# Patient Record
Sex: FEMALE | Race: WHITE | NOT HISPANIC OR LATINO
[De-identification: names, ages, dates, MRNs, and addresses within clinical notes are randomized per-mention and may not be internally consistent; named-entity substitution may affect disease eponyms.]

---

## 2019-05-24 ENCOUNTER — RESULT REVIEW (OUTPATIENT)
Age: 34
End: 2019-05-24

## 2020-02-13 ENCOUNTER — TRANSCRIPTION ENCOUNTER (OUTPATIENT)
Age: 35
End: 2020-02-13

## 2020-09-11 ENCOUNTER — RESULT REVIEW (OUTPATIENT)
Age: 35
End: 2020-09-11

## 2022-02-03 PROBLEM — Z00.00 ENCOUNTER FOR PREVENTIVE HEALTH EXAMINATION: Status: ACTIVE | Noted: 2022-02-03

## 2022-03-01 DIAGNOSIS — Z92.89 PERSONAL HISTORY OF OTHER MEDICAL TREATMENT: ICD-10-CM

## 2022-03-01 DIAGNOSIS — B97.7 PAPILLOMAVIRUS AS THE CAUSE OF DISEASES CLASSIFIED ELSEWHERE: ICD-10-CM

## 2022-03-01 DIAGNOSIS — Z86.59 PERSONAL HISTORY OF OTHER MENTAL AND BEHAVIORAL DISORDERS: ICD-10-CM

## 2022-03-03 ENCOUNTER — TRANSCRIPTION ENCOUNTER (OUTPATIENT)
Age: 37
End: 2022-03-03

## 2022-03-03 ENCOUNTER — APPOINTMENT (OUTPATIENT)
Dept: HEMATOLOGY ONCOLOGY | Facility: CLINIC | Age: 37
End: 2022-03-03
Payer: COMMERCIAL

## 2022-03-03 ENCOUNTER — NON-APPOINTMENT (OUTPATIENT)
Age: 37
End: 2022-03-03

## 2022-03-03 VITALS
HEART RATE: 81 BPM | BODY MASS INDEX: 31.47 KG/M2 | TEMPERATURE: 98.3 F | OXYGEN SATURATION: 97 % | DIASTOLIC BLOOD PRESSURE: 67 MMHG | RESPIRATION RATE: 18 BRPM | SYSTOLIC BLOOD PRESSURE: 106 MMHG | HEIGHT: 62 IN | WEIGHT: 171 LBS

## 2022-03-03 DIAGNOSIS — F32.A ANXIETY DISORDER, UNSPECIFIED: ICD-10-CM

## 2022-03-03 DIAGNOSIS — Z86.79 PERSONAL HISTORY OF OTHER DISEASES OF THE CIRCULATORY SYSTEM: ICD-10-CM

## 2022-03-03 DIAGNOSIS — F41.9 ANXIETY DISORDER, UNSPECIFIED: ICD-10-CM

## 2022-03-03 DIAGNOSIS — Z80.3 FAMILY HISTORY OF MALIGNANT NEOPLASM OF BREAST: ICD-10-CM

## 2022-03-03 DIAGNOSIS — Z87.891 PERSONAL HISTORY OF NICOTINE DEPENDENCE: ICD-10-CM

## 2022-03-03 DIAGNOSIS — Z72.89 OTHER PROBLEMS RELATED TO LIFESTYLE: ICD-10-CM

## 2022-03-03 DIAGNOSIS — Z83.2 FAMILY HISTORY OF DISEASES OF THE BLOOD AND BLOOD-FORMING ORGANS AND CERTAIN DISORDERS INVOLVING THE IMMUNE MECHANISM: ICD-10-CM

## 2022-03-03 PROCEDURE — 99205 OFFICE O/P NEW HI 60 MIN: CPT

## 2022-03-03 RX ORDER — CHROMIUM 200 MCG
TABLET ORAL
Refills: 0 | Status: ACTIVE | COMMUNITY

## 2022-03-03 RX ORDER — MULTIVITAMIN
CAPSULE ORAL
Refills: 0 | Status: ACTIVE | COMMUNITY

## 2022-03-03 NOTE — REVIEW OF SYSTEMS
[Fatigue] : fatigue [Easy Bleeding] : a tendency for easy bleeding [Negative] : Allergic/Immunologic

## 2022-03-04 LAB
ERYTHROCYTE [SEDIMENTATION RATE] IN BLOOD BY WESTERGREN METHOD: 28 MM/HR
FACT VIII ACT/NOR PPP: 159 %
RBC # BLD: 3.36 M/UL
RETICS # AUTO: 3.9 %
RETICS AGGREG/RBC NFR: 131 K/UL
VWF AG PPP IA-ACNC: 205 %
VWF:RCO ACT/NOR PPP PL AGG: 121 %

## 2022-03-04 NOTE — HISTORY OF PRESENT ILLNESS
[de-identified] : Mrs. Brewer is a 36 year old female who is referred by Dr. Aguilar for initial consultation for anemia and history of hemorrhage. \par She reports having a history of anemia and is currently complaining of being  tired and fatigued.  She has been taking low-dose oral iron however it is causing constipation.  Previously she had postpartum hemorrhage especially after her second , resulting in the necessity for 2 units red cell transfusion and IV iron infusion.  She also reports excessive bleeding with wisdom tooth extraction and after first .  Her family history is significant for mother and daughter with excessive bruising and mother with history of anemia. [0 - No Distress] : Distress Level: 0

## 2022-03-04 NOTE — REASON FOR VISIT
[Initial Consultation] : an initial consultation for [FreeTextEntry2] : Iron Deficiency Anemia, bleeding disorder/Post Partum Hemorrhage

## 2022-03-04 NOTE — ASSESSMENT
[FreeTextEntry1] : This appears to be a relatively chronic and progressive finding.\par This is most likely related to anemia of pregnancy.  She appears to be tolerating oral iron well but I will initiate a complete hematological work-up at the time of this office visit.\par I will obtain a CBC with differential and a peripheral blood smear review.\par I will obtain a complete hematological workup to include a CMP, LDH, haptoglobin, reticulocyte count, B12/MMA/folic acid levels, TSH, SPEP, SIEP, IgQ,serum free light chains, SANA,  RF, panel, ferritin level, and a Angeles' test.\par I will obtain an evaluation for coagulopathy given her prior hemorrhage with the second , reported excessive bleeding with the first  as well as a dental extraction, history of easy bruisability and family history of easy bruisability.\par The patient will return in 3 weeks for followup, review of the above workup, and further recommendations.\par \par Thank you very much for allowing me to participate in the care of this patient. Should you have any questions or concerns please do not hesitate to call me directly.

## 2022-03-17 ENCOUNTER — APPOINTMENT (OUTPATIENT)
Dept: HEMATOLOGY ONCOLOGY | Facility: CLINIC | Age: 37
End: 2022-03-17
Payer: COMMERCIAL

## 2022-03-17 VITALS
BODY MASS INDEX: 32.57 KG/M2 | TEMPERATURE: 97.8 F | HEART RATE: 85 BPM | DIASTOLIC BLOOD PRESSURE: 70 MMHG | HEIGHT: 62 IN | OXYGEN SATURATION: 99 % | SYSTOLIC BLOOD PRESSURE: 115 MMHG | WEIGHT: 177 LBS | RESPIRATION RATE: 18 BRPM

## 2022-03-17 PROCEDURE — 99214 OFFICE O/P EST MOD 30 MIN: CPT

## 2022-04-06 LAB
FERRITIN SERPL-MCNC: 19 NG/ML
IRON SATN MFR SERPL: 29 %
IRON SERPL-MCNC: 139 UG/DL
TIBC SERPL-MCNC: 481 UG/DL
UIBC SERPL-MCNC: 342 UG/DL

## 2022-04-06 NOTE — ASSESSMENT
[FreeTextEntry1] : This appears to be a relatively chronic and progressive finding.\par This is most likely related to anemia of pregnancy.  She appears to be tolerating oral iron well but I will initiate a complete hematological work-up at the time of this office visit.\par I obtained a CBC with differential and a peripheral blood smear review.\par I obtained a complete hematological workup to include a CMP, LDH, haptoglobin, reticulocyte count, B12/MMA/folic acid levels, TSH, SPEP, SIEP, IgQ,serum free light chains, SANA,  RF, panel, ferritin level, and a Angeles' test.\par I obtained an evaluation for coagulopathy given her prior hemorrhage with the second , reported excessive bleeding with the first  as well as a dental extraction, history of easy bruisability and family history of easy bruisability.  The work-up was mostly unremarkable and her coags were normal as well as von Willebrand disease testing and factor VIII levels.  The repeat hemoglobin at that visit was 10.3 g/dL otherwise the work-up was essentially unremarkable with low normal iron levels.  She continues to maintain her hemoglobin therefore she will continue on oral iron.  Given her significant bleeding history with the otherwise normal coagulation work-up I am referring her to [facility] to obtain further evaluation, particularly for platelet aggregation disorders.\par She will return in 3 weeks for follow-up and further recommendations.\par

## 2022-04-06 NOTE — HISTORY OF PRESENT ILLNESS
[de-identified] : Mrs. Brewer is a 36 year old female who is referred by Dr. Aguilar for initial consultation for anemia and history of hemorrhage. \par She reports having a history of anemia and is currently complaining of being  tired and fatigued.  She has been taking low-dose oral iron however it is causing constipation.  Previously she had postpartum hemorrhage especially after her second , resulting in the necessity for 2 units red cell transfusion and IV iron infusion.  She also reports excessive bleeding with wisdom tooth extraction and after first .  Her family history is significant for mother and daughter with excessive bruising and mother with history of anemia. [de-identified] : Offers no new complaints at the time of this office visit.  She reports that she continues to tolerate all oral iron well.  She is here to review the hematological work-up from her initial office consultation visit. [0 - No Distress] : Distress Level: 0

## 2022-04-06 NOTE — REASON FOR VISIT
[Follow-Up Visit] : a follow-up visit for [FreeTextEntry2] : Iron Deficiency Anemia, bleeding disorder/Post Partum Hemorrhage

## 2022-04-07 ENCOUNTER — APPOINTMENT (OUTPATIENT)
Dept: HEMATOLOGY ONCOLOGY | Facility: CLINIC | Age: 37
End: 2022-04-07
Payer: COMMERCIAL

## 2022-04-19 ENCOUNTER — APPOINTMENT (OUTPATIENT)
Dept: HEMATOLOGY ONCOLOGY | Facility: CLINIC | Age: 37
End: 2022-04-19
Payer: COMMERCIAL

## 2022-04-19 VITALS
DIASTOLIC BLOOD PRESSURE: 67 MMHG | SYSTOLIC BLOOD PRESSURE: 110 MMHG | WEIGHT: 180 LBS | BODY MASS INDEX: 33.13 KG/M2 | HEIGHT: 62 IN | HEART RATE: 82 BPM | OXYGEN SATURATION: 97 % | RESPIRATION RATE: 18 BRPM | TEMPERATURE: 98.8 F

## 2022-04-19 DIAGNOSIS — D64.9 ANEMIA, UNSPECIFIED: ICD-10-CM

## 2022-04-19 PROCEDURE — 36415 COLL VENOUS BLD VENIPUNCTURE: CPT

## 2022-04-19 PROCEDURE — 99213 OFFICE O/P EST LOW 20 MIN: CPT | Mod: 25

## 2022-04-19 NOTE — REVIEW OF SYSTEMS
[Fatigue] : fatigue [Easy Bleeding] : a tendency for easy bleeding [Negative] : Allergic/Immunologic [Constipation] : constipation [FreeTextEntry7] : With oral iron

## 2022-04-19 NOTE — HISTORY OF PRESENT ILLNESS
[0 - No Distress] : Distress Level: 0 [de-identified] : Mrs. Brewer is a 36 year old female who is referred by Dr. Aguilar for initial consultation for anemia and history of hemorrhage. \par She reports having a history of anemia and is currently complaining of being  tired and fatigued.  She has been taking low-dose oral iron however it is causing constipation.  Previously she had postpartum hemorrhage especially after her second , resulting in the necessity for 2 units red cell transfusion and IV iron infusion.  She also reports excessive bleeding with wisdom tooth extraction and after first .  Her family history is significant for mother and daughter with excessive bruising and mother with history of anemia. [de-identified] : She presents for follow-up and is due next Friday for elective .  She has not been taking oral iron for the past week or 2 because of constipation issues.  Previous work-up failed to reveal a coagulopathy.  She was referred to [facility] for platelet aggregation studies but she is unaware of the results.  She requested that we receive the records however we have not received the records as of the time of this office visit.

## 2022-04-19 NOTE — ASSESSMENT
[FreeTextEntry1] : This appears to be a relatively chronic and progressive finding.\par This is most likely related to anemia of pregnancy.  She appears to be tolerating oral iron well but I will initiate a complete hematological work-up at the time of this office visit.\par I obtained a CBC with differential and a peripheral blood smear review.\par I obtained a complete hematological workup to include a CMP, LDH, haptoglobin, reticulocyte count, B12/MMA/folic acid levels, TSH, SPEP, SIEP, IgQ,serum free light chains, SANA,  RF, panel, ferritin level, and a Angeles' test.\par I obtained an evaluation for coagulopathy given her prior hemorrhage with the second , reported excessive bleeding with the first  as well as a dental extraction, history of easy bruisability and family history of easy bruisability.  The work-up was mostly unremarkable and her coags were normal as well as von Willebrand disease testing and factor VIII levels.  The repeat hemoglobin at that visit was 10.3 g/dL otherwise the work-up was essentially unremarkable with low normal iron levels.  She continues to maintain her hemoglobin therefore she will continue on oral iron.  Given her significant bleeding history with the otherwise normal coagulation work-up I am referring her to [facility] to obtain further evaluation, particularly for platelet aggregation disorders.\par We are requesting results of the platelet aggregation studies once again.  We have reviewed available labs which showed slight improvement in hemoglobin.  We discussed the possibility of her receiving IV iron after delivering.\par Continue routine, age-appropriate, healthcare maintenance \par History of present illness, review of systems, physical exam and treatment plan reviewed with . \par Office visit in 12 weeks or prn for new or worsening symptoms.  \par

## 2022-05-01 ENCOUNTER — TRANSCRIPTION ENCOUNTER (OUTPATIENT)
Age: 37
End: 2022-05-01

## 2022-09-03 LAB
ALBUMIN MFR SERPL ELPH: 52.8 %
ALBUMIN SERPL-MCNC: 3.4 G/DL
ALBUMIN/GLOB SERPL: 1.1 RATIO
ALPHA1 GLOB MFR SERPL ELPH: 6.5 %
ALPHA1 GLOB SERPL ELPH-MCNC: 0.4 G/DL
ALPHA2 GLOB MFR SERPL ELPH: 14.5 %
ALPHA2 GLOB SERPL ELPH-MCNC: 0.9 G/DL
ANA PAT FLD IF-IMP: NORMAL
ANA SER IF-ACNC: ABNORMAL
B-GLOBULIN MFR SERPL ELPH: 15 %
B-GLOBULIN SERPL ELPH-MCNC: 1 G/DL
DEPRECATED KAPPA LC FREE/LAMBDA SER: 1.53 RATIO
DEPRECATED KAPPA LC FREE/LAMBDA SER: 1.53 RATIO
FERRITIN SERPL-MCNC: 30 NG/ML
FOLATE SERPL-MCNC: >20 NG/ML
GAMMA GLOB FLD ELPH-MCNC: 0.7 G/DL
GAMMA GLOB MFR SERPL ELPH: 11.2 %
HAPTOGLOB SERPL-MCNC: 87 MG/DL
IGA SER QL IEP: 171 MG/DL
IGG SER QL IEP: 685 MG/DL
IGM SER QL IEP: 115 MG/DL
INTERPRETATION SERPL IEP-IMP: NORMAL
IRON SATN MFR SERPL: 17 %
IRON SERPL-MCNC: 86 UG/DL
KAPPA LC CSF-MCNC: 0.93 MG/DL
KAPPA LC CSF-MCNC: 0.93 MG/DL
KAPPA LC SERPL-MCNC: 1.42 MG/DL
KAPPA LC SERPL-MCNC: 1.42 MG/DL
LDH SERPL-CCNC: 274 U/L
M PROTEIN SPEC IFE-MCNC: NORMAL
METHYLMALONATE SERPL-SCNC: 170 NMOL/L
PROT SERPL-MCNC: 6.4 G/DL
PROT SERPL-MCNC: 6.4 G/DL
TIBC SERPL-MCNC: 491 UG/DL
TSH SERPL-ACNC: 1.33 UIU/ML
UIBC SERPL-MCNC: 405 UG/DL
VIT B12 SERPL-MCNC: 333 PG/ML
VWF MULTIMERS PPP IA-ACNC: NORMAL

## 2022-09-21 ENCOUNTER — RESULT REVIEW (OUTPATIENT)
Age: 37
End: 2022-09-21

## 2024-03-06 ENCOUNTER — NON-APPOINTMENT (OUTPATIENT)
Age: 39
End: 2024-03-06

## 2024-03-06 LAB
FERRITIN SERPL-MCNC: 15 NG/ML
IRON SATN MFR SERPL: 16 %
IRON SERPL-MCNC: 83 UG/DL
TIBC SERPL-MCNC: 529 UG/DL
UIBC SERPL-MCNC: 446 UG/DL